# Patient Record
Sex: FEMALE | Race: WHITE | URBAN - METROPOLITAN AREA
[De-identification: names, ages, dates, MRNs, and addresses within clinical notes are randomized per-mention and may not be internally consistent; named-entity substitution may affect disease eponyms.]

---

## 2017-01-12 ENCOUNTER — OFFICE (OUTPATIENT)
Dept: URBAN - METROPOLITAN AREA CLINIC 78 | Facility: CLINIC | Age: 22
End: 2017-01-12
Payer: COMMERCIAL

## 2017-01-12 VITALS
WEIGHT: 151 LBS | TEMPERATURE: 98 F | SYSTOLIC BLOOD PRESSURE: 88 MMHG | HEART RATE: 117 BPM | HEIGHT: 69 IN | DIASTOLIC BLOOD PRESSURE: 66 MMHG

## 2017-01-12 DIAGNOSIS — R19.7 DIARRHEA, UNSPECIFIED: ICD-10-CM

## 2017-01-12 DIAGNOSIS — K92.1 MELENA: ICD-10-CM

## 2017-01-12 DIAGNOSIS — R10.33 PERIUMBILICAL PAIN: ICD-10-CM

## 2017-01-12 PROCEDURE — 99244 OFF/OP CNSLTJ NEW/EST MOD 40: CPT

## 2017-01-12 NOTE — INTERFACERESULTNOTES
Potassium low but these labs are 10 days old - called surgery center and spoke to pre-op nurse to inform as patient has colonoscopy scheduled for today. She will let Dr. Ulloa know.

## 2017-01-16 LAB
CBC W/DIFF: HEMATOCRIT: 44 %
CBC W/DIFF: HEMOGLOBIN: 15.2 GM/DL
CBC W/DIFF: IMMATURE GRANULOCYTES #: 0.13 K/MM3 — HIGH
CBC W/DIFF: IMMATURE GRANULOCYTES %: 1 % — HIGH
CBC W/DIFF: MEAN CORPUSCULAR HEMOGLOBIN: 30.2 PG
CBC W/DIFF: MEAN CORPUSCULAR HGB CONC: 34.6 G/DL
CBC W/DIFF: MEAN CORPUSCULAR VOLUME: 87.3 FL
CBC W/DIFF: MEAN PLATELET VOLUME: 11.4 FL — HIGH
CBC W/DIFF: NRBC ABSOLUTE COUNT: 0 K/MM3
CBC W/DIFF: NRBC%: 0 /100WBC
CBC W/DIFF: PLATELET COUNT: 431 K/MM3
CBC W/DIFF: RED BLOOD COUNT: 5.03 M/MM3 — HIGH
CBC W/DIFF: RED CELL DISTRIBUTION WIDTH #: 42.7 FL
CBC W/DIFF: RED CELL DISTRIBUTION WIDTH %: 13.7 %
CBC W/DIFF: WHITE BLOOD COUNT: 13.2 K/MM3 — HIGH
COMPREHENSIVE METABOLIC PROF: ALANINE AMINOTRANS (ALT/GPT): 32 IU/L
COMPREHENSIVE METABOLIC PROF: ALBUMIN: 2.6 GM/DL — LOW
COMPREHENSIVE METABOLIC PROF: ALK PHOS,TOTAL: 68 IU/L
COMPREHENSIVE METABOLIC PROF: ANION GAP: 11
COMPREHENSIVE METABOLIC PROF: AST/SGOT ASPARTATE AMINO TRANS: 28 U/L
COMPREHENSIVE METABOLIC PROF: BILIRUBIN,TOTAL: 0.4 MG/DL
COMPREHENSIVE METABOLIC PROF: BLOOD UREA NITROGEN: 10 MG/DL
COMPREHENSIVE METABOLIC PROF: CALCIUM LEVEL: 8.4 MG/DL — LOW
COMPREHENSIVE METABOLIC PROF: CARBON DIOXIDE: 31 MEQ/L
COMPREHENSIVE METABOLIC PROF: CHLORIDE LEVEL: 94 MEQ/L — LOW
COMPREHENSIVE METABOLIC PROF: CREATININE,SERUM: 0.88 MG/DL
COMPREHENSIVE METABOLIC PROF: GLOMERULAR FILTRATION RATE: > 60 ML/MIN
COMPREHENSIVE METABOLIC PROF: GLUCOSE,RANDOM: 91 MG/DL
COMPREHENSIVE METABOLIC PROF: POTASSIUM LEVEL: 2.7 MEQ/L — LOW
COMPREHENSIVE METABOLIC PROF: SODIUM LEVEL, SERUM: 136 MEQ/L
COMPREHENSIVE METABOLIC PROF: TOTAL PROTEIN: 5.6 GM/DL — LOW
Lab: (no result)
Lab: 1 %
Lab: 14 %
Lab: 14 % — HIGH
Lab: 17 %
Lab: 54 %
Lab: 8.9 K/MM3 — HIGH
Lab: ADEQUATE
Lab: NORMAL
Lab: NORMAL

## 2017-01-19 LAB
CDIFF TOXIN ASSAY (PCR): CDIFF DNA PATIENT RESULT: NEGATIVE
CDIFF TOXIN ASSAY (PCR): PERFORMING LOCATION: (no result)

## 2017-01-21 LAB
STOOL CULTURE: (no result)
STOOL CULTURE: NORMAL

## 2017-01-26 ENCOUNTER — AMBULATORY SURGICAL CENTER (OUTPATIENT)
Dept: URBAN - METROPOLITAN AREA SURGERY 21 | Facility: SURGERY | Age: 22
End: 2017-01-26
Payer: COMMERCIAL

## 2017-01-26 DIAGNOSIS — R10.33 PERIUMBILICAL PAIN: ICD-10-CM

## 2017-01-26 DIAGNOSIS — K52.89 OTHER SPECIFIED NONINFECTIVE GASTROENTERITIS AND COLITIS: ICD-10-CM

## 2017-01-26 DIAGNOSIS — R93.5 ABNORMAL FINDINGS ON DIAGNOSTIC IMAGING OF OTHER ABDOMINAL R: ICD-10-CM

## 2017-01-26 PROCEDURE — 45380 COLONOSCOPY AND BIOPSY: CPT

## 2017-02-13 ENCOUNTER — OFFICE (OUTPATIENT)
Dept: URBAN - METROPOLITAN AREA CLINIC 33 | Facility: CLINIC | Age: 22
End: 2017-02-13
Payer: COMMERCIAL

## 2017-02-13 VITALS
WEIGHT: 160 LBS | SYSTOLIC BLOOD PRESSURE: 104 MMHG | HEART RATE: 86 BPM | DIASTOLIC BLOOD PRESSURE: 71 MMHG | TEMPERATURE: 98.1 F | HEIGHT: 69 IN

## 2017-02-13 DIAGNOSIS — K51.80 OTHER ULCERATIVE COLITIS WITHOUT COMPLICATIONS: ICD-10-CM

## 2017-02-13 DIAGNOSIS — E55.9 VITAMIN D DEFICIENCY, UNSPECIFIED: ICD-10-CM

## 2017-02-13 PROCEDURE — 99214 OFFICE O/P EST MOD 30 MIN: CPT

## 2017-02-13 RX ORDER — CHOLECALCIFEROL (VITAMIN D3) 1250 MCG
CAPSULE ORAL
Qty: 8 | Refills: -1 | Status: COMPLETED
Start: 2017-02-13 | End: 2017-08-03

## 2017-04-24 ENCOUNTER — OFFICE (OUTPATIENT)
Dept: URBAN - METROPOLITAN AREA CLINIC 33 | Facility: CLINIC | Age: 22
End: 2017-04-24
Payer: COMMERCIAL

## 2017-04-24 VITALS
HEIGHT: 69 IN | SYSTOLIC BLOOD PRESSURE: 118 MMHG | DIASTOLIC BLOOD PRESSURE: 91 MMHG | WEIGHT: 160 LBS | TEMPERATURE: 97.3 F | HEART RATE: 90 BPM

## 2017-04-24 DIAGNOSIS — E55.9 VITAMIN D DEFICIENCY, UNSPECIFIED: ICD-10-CM

## 2017-04-24 DIAGNOSIS — K51.80 OTHER ULCERATIVE COLITIS WITHOUT COMPLICATIONS: ICD-10-CM

## 2017-04-24 PROCEDURE — 99214 OFFICE O/P EST MOD 30 MIN: CPT

## 2017-08-03 ENCOUNTER — OFFICE (OUTPATIENT)
Dept: URBAN - METROPOLITAN AREA CLINIC 78 | Facility: CLINIC | Age: 22
End: 2017-08-03
Payer: COMMERCIAL

## 2017-08-03 VITALS
SYSTOLIC BLOOD PRESSURE: 102 MMHG | WEIGHT: 167 LBS | HEART RATE: 74 BPM | DIASTOLIC BLOOD PRESSURE: 77 MMHG | TEMPERATURE: 98.3 F | HEIGHT: 69 IN

## 2017-08-03 DIAGNOSIS — K51.80 OTHER ULCERATIVE COLITIS WITHOUT COMPLICATIONS: ICD-10-CM

## 2017-08-03 DIAGNOSIS — E55.9 VITAMIN D DEFICIENCY, UNSPECIFIED: ICD-10-CM

## 2017-08-03 PROCEDURE — 99213 OFFICE O/P EST LOW 20 MIN: CPT
